# Patient Record
Sex: FEMALE | Race: WHITE | NOT HISPANIC OR LATINO | Employment: FULL TIME | URBAN - METROPOLITAN AREA
[De-identification: names, ages, dates, MRNs, and addresses within clinical notes are randomized per-mention and may not be internally consistent; named-entity substitution may affect disease eponyms.]

---

## 2018-10-07 ENCOUNTER — HOSPITAL ENCOUNTER (EMERGENCY)
Facility: HOSPITAL | Age: 48
Discharge: HOME | End: 2018-10-07
Attending: EMERGENCY MEDICINE
Payer: MEDICAID

## 2018-10-07 VITALS
WEIGHT: 158 LBS | DIASTOLIC BLOOD PRESSURE: 85 MMHG | HEART RATE: 90 BPM | HEIGHT: 68 IN | TEMPERATURE: 98 F | RESPIRATION RATE: 18 BRPM | OXYGEN SATURATION: 100 % | BODY MASS INDEX: 23.95 KG/M2 | SYSTOLIC BLOOD PRESSURE: 141 MMHG

## 2018-10-07 DIAGNOSIS — R51.9 HEADACHE: ICD-10-CM

## 2018-10-07 LAB
ALBUMIN SERPL BCP-MCNC: 4.2 G/DL
ALP SERPL-CCNC: 101 U/L
ALT SERPL W/O P-5'-P-CCNC: 21 U/L
ANION GAP SERPL CALC-SCNC: 10 MMOL/L
AST SERPL-CCNC: 24 U/L
BASOPHILS # BLD AUTO: 0.01 K/UL
BASOPHILS NFR BLD: 0.2 %
BILIRUB SERPL-MCNC: 0.6 MG/DL
BUN SERPL-MCNC: 9 MG/DL
CALCIUM SERPL-MCNC: 9.9 MG/DL
CHLORIDE SERPL-SCNC: 104 MMOL/L
CO2 SERPL-SCNC: 24 MMOL/L
CREAT SERPL-MCNC: 0.7 MG/DL
DIFFERENTIAL METHOD: ABNORMAL
EOSINOPHIL # BLD AUTO: 0 K/UL
EOSINOPHIL NFR BLD: 0.8 %
ERYTHROCYTE [DISTWIDTH] IN BLOOD BY AUTOMATED COUNT: 13.7 %
EST. GFR  (AFRICAN AMERICAN): >60 ML/MIN/1.73 M^2
EST. GFR  (NON AFRICAN AMERICAN): >60 ML/MIN/1.73 M^2
GLUCOSE SERPL-MCNC: 85 MG/DL
HCT VFR BLD AUTO: 43 %
HGB BLD-MCNC: 15.4 G/DL
LYMPHOCYTES # BLD AUTO: 1.1 K/UL
LYMPHOCYTES NFR BLD: 22.8 %
MCH RBC QN AUTO: 30.2 PG
MCHC RBC AUTO-ENTMCNC: 35.8 G/DL
MCV RBC AUTO: 84 FL
MONOCYTES # BLD AUTO: 0.4 K/UL
MONOCYTES NFR BLD: 7.2 %
NEUTROPHILS # BLD AUTO: 3.3 K/UL
NEUTROPHILS NFR BLD: 69 %
PLATELET # BLD AUTO: 258 K/UL
PMV BLD AUTO: 9.1 FL
POTASSIUM SERPL-SCNC: 4.3 MMOL/L
PROT SERPL-MCNC: 8 G/DL
RBC # BLD AUTO: 5.1 M/UL
SODIUM SERPL-SCNC: 138 MMOL/L
WBC # BLD AUTO: 4.83 K/UL

## 2018-10-07 PROCEDURE — 80053 COMPREHEN METABOLIC PANEL: CPT

## 2018-10-07 PROCEDURE — 63600175 PHARM REV CODE 636 W HCPCS: Performed by: EMERGENCY MEDICINE

## 2018-10-07 PROCEDURE — 99284 EMERGENCY DEPT VISIT MOD MDM: CPT | Mod: 25

## 2018-10-07 PROCEDURE — 96375 TX/PRO/DX INJ NEW DRUG ADDON: CPT

## 2018-10-07 PROCEDURE — 96374 THER/PROPH/DIAG INJ IV PUSH: CPT

## 2018-10-07 PROCEDURE — 25000003 PHARM REV CODE 250: Performed by: EMERGENCY MEDICINE

## 2018-10-07 PROCEDURE — 96361 HYDRATE IV INFUSION ADD-ON: CPT

## 2018-10-07 PROCEDURE — 85025 COMPLETE CBC W/AUTO DIFF WBC: CPT

## 2018-10-07 RX ORDER — KETOROLAC TROMETHAMINE 30 MG/ML
30 INJECTION, SOLUTION INTRAMUSCULAR; INTRAVENOUS
Status: COMPLETED | OUTPATIENT
Start: 2018-10-07 | End: 2018-10-07

## 2018-10-07 RX ORDER — DULOXETIN HYDROCHLORIDE 20 MG/1
20 CAPSULE, DELAYED RELEASE ORAL DAILY
COMMUNITY

## 2018-10-07 RX ORDER — BUTALBITAL, ACETAMINOPHEN AND CAFFEINE 50; 325; 40 MG/1; MG/1; MG/1
1 TABLET ORAL EVERY 4 HOURS PRN
Qty: 30 TABLET | Refills: 0 | Status: SHIPPED | OUTPATIENT
Start: 2018-10-07 | End: 2018-11-06

## 2018-10-07 RX ORDER — DEXTROAMPHETAMINE SACCHARATE, AMPHETAMINE ASPARTATE MONOHYDRATE, DEXTROAMPHETAMINE SULFATE AND AMPHETAMINE SULFATE 7.5; 7.5; 7.5; 7.5 MG/1; MG/1; MG/1; MG/1
30 CAPSULE, EXTENDED RELEASE ORAL EVERY MORNING
COMMUNITY

## 2018-10-07 RX ORDER — METOCLOPRAMIDE HYDROCHLORIDE 5 MG/ML
10 INJECTION INTRAMUSCULAR; INTRAVENOUS
Status: COMPLETED | OUTPATIENT
Start: 2018-10-07 | End: 2018-10-07

## 2018-10-07 RX ORDER — ZOLPIDEM TARTRATE 10 MG/1
5 TABLET ORAL NIGHTLY PRN
COMMUNITY
End: 2019-02-18

## 2018-10-07 RX ORDER — DIPHENHYDRAMINE HYDROCHLORIDE 50 MG/ML
25 INJECTION INTRAMUSCULAR; INTRAVENOUS
Status: COMPLETED | OUTPATIENT
Start: 2018-10-07 | End: 2018-10-07

## 2018-10-07 RX ADMIN — SODIUM CHLORIDE 1000 ML: 0.9 INJECTION, SOLUTION INTRAVENOUS at 11:10

## 2018-10-07 RX ADMIN — DIPHENHYDRAMINE HYDROCHLORIDE 25 MG: 50 INJECTION, SOLUTION INTRAMUSCULAR; INTRAVENOUS at 11:10

## 2018-10-07 RX ADMIN — KETOROLAC TROMETHAMINE 30 MG: 30 INJECTION, SOLUTION INTRAMUSCULAR at 11:10

## 2018-10-07 RX ADMIN — METOCLOPRAMIDE 10 MG: 5 INJECTION, SOLUTION INTRAMUSCULAR; INTRAVENOUS at 11:10

## 2018-10-07 NOTE — DISCHARGE INSTRUCTIONS
Patient's headache is consistent with previous headaches and lacks features concerning for emergent or life threatening condition.  I do not suspect SAH, meningitis, increased IC pressure, infectious, toxic, vascular, CNS, or other EMC.  I have discussed this at length with patient.  Regarding treatment, advised patient to take nonsteroidal antiinflammatory medications, acetaminophen, or any medications prescribed as instructed.  To prevent headaches, patient advised to avoid muscle tension (do not stay in one position for long periods of time), avoid eye strain (make sure there is adequate lighting for reading and routine tasks), eat healthy foods, exercise, and do not smoke or drink excessive alcohol.  Patient also advised to avoid overuse of over-the-counter or prescription medications. Patient was instructed to contact primary healthcare provider if: headaches continue to get worse; occur often enough that they affect daily work or normal activities; frequent medication use is needed to manage headaches; headaches that worsen and cause vomiting; or there are any questions or concerns about the condition or care. Advised patient to return to the emergency department or call 911 if they develop a sudden headache that presents suddenly and much worse than usual headaches; have difficulty seeing, speaking, or moving; become confused or have seizure activity; or develop a fever and stiff neck with the headache.

## 2018-10-07 NOTE — ED PROVIDER NOTES
"SCRIBE #1 NOTE: I, Carlitos Aneudy, am scribing for, and in the presence of, Josiah Gore Jr., MD. I have scribed the entire note.      History      Chief Complaint   Patient presents with    Headache     Pt states, "[facility] after hours sent me here because I've been having a bad headache and vomiting, and have a blind spot in my left eye."       Review of patient's allergies indicates:  No Known Allergies     HPI   HPI    10/7/2018, 10:06 AM   History obtained from the patient      History of Present Illness: Laurel FENG is a 48 y.o. female patient who presents to the Emergency Department for an evaluation of a HA which onset gradually yesterday afternoon. Pt has a hx of migraines, but has never experienced a similar HA like this. Pt's states her pain radiates from the top of her head down to her L shoulder. Symptoms are constant and moderate in severity. No mitigating or exacerbating factors reported. Pt notes she has been unable to sleep, so she took ambien around 4 am today, but was unable to keep it down secondary to the vomiting. Associated sxs include dizziness, photophobia, n/v. Patient denies any fever, chills, diarrhea, numbness, tingling, extremity weakness, visual disturbance, neck pain/stiffness and all other sxs at this time. Pt visited [facility] after [facility] clinic, where she was given Zofran. Pt notes she used to take tramadol for migraines, which would help her migraines. No further complaints or concerns at this time.       Arrival mode: Personal vehicle     PCP: Primary Doctor No       Past Medical History:  Past Medical History:   Diagnosis Date    Gestational diabetes     gestational     Migraine headache     Preeclampsia     Urethral stricture        Past Surgical History:  Past Surgical History:   Procedure Laterality Date    APPENDECTOMY       SECTION      x1    TONSILLECTOMY      TUBAL LIGATION      URETHRA SURGERY           Family History:  Family History   Problem " Relation Age of Onset    Hypertension Unknown        Social History:  Social History     Tobacco Use    Smoking status: Never Smoker   Substance and Sexual Activity    Alcohol use: No    Drug use: No    Sexual activity: unknown       ROS   Review of Systems   Constitutional: Negative for chills and fever.   HENT: Negative for sore throat.    Eyes: Positive for photophobia. Negative for visual disturbance.   Respiratory: Negative for shortness of breath.    Cardiovascular: Negative for chest pain.   Gastrointestinal: Positive for nausea and vomiting. Negative for diarrhea.   Genitourinary: Negative for dysuria.   Musculoskeletal: Negative for back pain, neck pain and neck stiffness.   Skin: Negative for rash.   Neurological: Positive for dizziness and headaches. Negative for weakness and numbness.   Hematological: Does not bruise/bleed easily.   Psychiatric/Behavioral: Positive for sleep disturbance.   All other systems reviewed and are negative.      Physical Exam      Initial Vitals [10/07/18 0928]   BP Pulse Resp Temp SpO2   132/87 70 20 97.6 °F (36.4 °C) 100 %      MAP       --          Physical Exam  Nursing Notes and Vital Signs Reviewed.  Constitutional: Patient is in no acute distress. Well-developed and well-nourished.  Head: Atraumatic. Normocephalic.  Eyes: PERRL. EOM intact. Conjunctivae are not pale. No scleral icterus.  ENT: Mucous membranes are moist. Oropharynx is clear and symmetric.    Neck: Supple. Full ROM. No lymphadenopathy. No meningismus.   Cardiovascular: Regular rate. Regular rhythm. No murmurs, rubs, or gallops. Distal pulses are 2+ and symmetric.  Pulmonary/Chest: No respiratory distress. Clear to auscultation bilaterally. No wheezing or rales.  Abdominal: Soft and non-distended.  There is no tenderness.  No rebound, guarding, or rigidity. Good bowel sounds.  Genitourinary: No CVA tenderness  Musculoskeletal: Moves all extremities. No obvious deformities. No edema. No calf  "tenderness.  Skin: Warm and dry.  Neurological:  Alert, awake, and appropriate. CNII-XII intact. GCS 15. Equal  strength. Normal speech.  No acute focal neurological deficits are appreciated.  Psychiatric: Normal affect. Good eye contact. Appropriate in content.    ED Course    Procedures  ED Vital Signs:  Vitals:    10/07/18 0928 10/07/18 0939 10/07/18 0953 10/07/18 1136   BP: 132/87  124/74 (!) 146/85   Pulse: 70 71 64 86   Resp: 20 18 18   Temp: 97.6 °F (36.4 °C)      TempSrc: Oral      SpO2: 100%  100% 100%   Weight: 71.7 kg (158 lb)      Height: 5' 8" (1.727 m)       10/07/18 1202 10/07/18 1238   BP: (!) 157/89 (!) 141/85   Pulse: 72 90   Resp: 18 18   Temp:  97.8 °F (36.6 °C)   TempSrc:     SpO2: 100% 100%   Weight:     Height:         Abnormal Lab Results:  Labs Reviewed   CBC W/ AUTO DIFFERENTIAL - Abnormal; Notable for the following components:       Result Value    MPV 9.1 (*)     All other components within normal limits   COMPREHENSIVE METABOLIC PANEL        All Lab Results:  Results for orders placed or performed during the hospital encounter of 10/07/18   CBC auto differential   Result Value Ref Range    WBC 4.83 3.90 - 12.70 K/uL    RBC 5.10 4.00 - 5.40 M/uL    Hemoglobin 15.4 12.0 - 16.0 g/dL    Hematocrit 43.0 37.0 - 48.5 %    MCV 84 82 - 98 fL    MCH 30.2 27.0 - 31.0 pg    MCHC 35.8 32.0 - 36.0 g/dL    RDW 13.7 11.5 - 14.5 %    Platelets 258 150 - 350 K/uL    MPV 9.1 (L) 9.2 - 12.9 fL    Gran # (ANC) 3.3 1.8 - 7.7 K/uL    Lymph # 1.1 1.0 - 4.8 K/uL    Mono # 0.4 0.3 - 1.0 K/uL    Eos # 0.0 0.0 - 0.5 K/uL    Baso # 0.01 0.00 - 0.20 K/uL    Gran% 69.0 38.0 - 73.0 %    Lymph% 22.8 18.0 - 48.0 %    Mono% 7.2 4.0 - 15.0 %    Eosinophil% 0.8 0.0 - 8.0 %    Basophil% 0.2 0.0 - 1.9 %    Differential Method Automated    Comprehensive metabolic panel   Result Value Ref Range    Sodium 138 136 - 145 mmol/L    Potassium 4.3 3.5 - 5.1 mmol/L    Chloride 104 95 - 110 mmol/L    CO2 24 23 - 29 mmol/L    " Glucose 85 70 - 110 mg/dL    BUN, Bld 9 6 - 20 mg/dL    Creatinine 0.7 0.5 - 1.4 mg/dL    Calcium 9.9 8.7 - 10.5 mg/dL    Total Protein 8.0 6.0 - 8.4 g/dL    Albumin 4.2 3.5 - 5.2 g/dL    Total Bilirubin 0.6 0.1 - 1.0 mg/dL    Alkaline Phosphatase 101 55 - 135 U/L    AST 24 10 - 40 U/L    ALT 21 10 - 44 U/L    Anion Gap 10 8 - 16 mmol/L    eGFR if African American >60 >60 mL/min/1.73 m^2    eGFR if non African American >60 >60 mL/min/1.73 m^2         Imaging Results:  Imaging Results          CT Head Without Contrast (Final result)  Result time 10/07/18 11:00:08    Final result by Armani Torres MD (10/07/18 11:00:08)                 Impression:      No acute abnormality.    All CT scans at this facility use dose modulation, iterative reconstruction, and/or weight based dosing when appropriate to reduce radiation dose to as low as reasonably achievable.      Electronically signed by: Armani Torres  Date:    10/07/2018  Time:    11:00             Narrative:    EXAMINATION:  CT HEAD WITHOUT CONTRAST    CLINICAL HISTORY:  Lung cancer, non-small cell, staging; Headache    TECHNIQUE:  Low dose axial CT images obtained throughout the head without intravenous contrast. Sagittal and coronal reconstructions were performed.    COMPARISON:  None.    FINDINGS:  Intracranial compartment:    Ventricles and sulci are normal in size for age without evidence of hydrocephalus. No extra-axial blood or fluid collections.    The brain parenchyma appears normal. No parenchymal mass, hemorrhage, edema or major vascular distribution infarct.    Skull/extracranial contents (limited evaluation): No fracture. Mastoid air cells and paranasal sinuses are essentially clear.                                        The Emergency Provider reviewed the vital signs and test results, which are outlined above.    ED Discussion     12:30 PM: Reassessed pt at this time. Discussed with pt all pertinent ED information and results. Discussed pt dx and plan of  tx. Gave pt all f/u and return to the ED instructions. All questions and concerns were addressed at this time. Pt expresses understanding of information and instructions, and is comfortable with plan to discharge. Pt is stable for discharge.    Patient's headache is either consistent with previous headache and/or lacks features concerning for emergent or life threatening condition.  I do not suspect SAH, meningitis, increased IC pressure, infectious, toxic, vascular, CNS, or other EMC.  I have discussed this at length with patient and/or family/caretaker.    Patient's headache is consistent with previous headaches and lacks features concerning for emergent or life threatening condition.  I do not suspect SAH, meningitis, increased IC pressure, infectious, toxic, vascular, CNS, or other EMC.  I have discussed this at length with patient.  Regarding treatment, advised patient to take nonsteroidal antiinflammatory medications, acetaminophen, or any medications prescribed as instructed.  To prevent headaches, patient advised to avoid muscle tension (do not stay in one position for long periods of time), avoid eye strain (make sure there is adequate lighting for reading and routine tasks), eat healthy foods, exercise, and do not smoke or drink excessive alcohol.  Patient also advised to avoid overuse of over-the-counter or prescription medications. Patient was instructed to contact primary healthcare provider if: headaches continue to get worse; occur often enough that they affect daily work or normal activities; frequent medication use is needed to manage headaches; headaches that worsen and cause vomiting; or there are any questions or concerns about the condition or care. Advised patient to return to the emergency department or call 911 if they develop a sudden headache that presents suddenly and much worse than usual headaches; have difficulty seeing, speaking, or moving; become confused or have seizure activity; or  develop a fever and stiff neck with the headache.         ED Medication(s):  Medications   sodium chloride 0.9% bolus 1,000 mL (0 mLs Intravenous Stopped 10/7/18 1238)   metoclopramide HCl injection 10 mg (10 mg Intravenous Given 10/7/18 1129)   diphenhydrAMINE injection 25 mg (25 mg Intravenous Given 10/7/18 1130)   ketorolac injection 30 mg (30 mg Intravenous Given 10/7/18 1129)     Discharge Medication List as of 10/7/2018 12:24 PM      START taking these medications    Details   butalbital-acetaminophen-caffeine -40 mg (FIORICET, ESGIC) -40 mg per tablet Take 1 tablet by mouth every 4 (four) hours as needed for Pain or Headaches., Starting Sun 10/7/2018, Until Tue 11/6/2018, Print          Follow-up Information     [facility]. Schedule an appointment as soon as possible for a visit in 1 week.    Specialty:  Internal Medicine  Contact information:  [address] [facility] [address] [phone]  Additional information:  (off [facility]) 1st floor           [facility]. Schedule an appointment as soon as possible for a visit in 1 week.    Contact information:  [address] [facility] [address] [phone]             [facility]    Specialty:  Emergency Medicine  Why:  As needed, If symptoms worsen  Contact information:  [address] [facility] [address] [phone]                   Medical Decision Making    Medical Decision Making:   Clinical Tests:   Lab Tests: Ordered and Reviewed  Radiological Study: Ordered and Reviewed           Scribe Attestation:   Scribe #1: I performed the above scribed service and the documentation accurately describes the services I performed. I attest to the accuracy of the note.    Attending:   Physician Attestation Statement for Scribe #1: I, Josiah Gore Jr., MD, personally performed the services described in this documentation, as scribed by Carlitos Amado, in my  presence, and it is both accurate and complete.          Clinical Impression       ICD-10-CM ICD-9-CM   1. Headache R51 784.0       Disposition:   Disposition: Discharged  Condition: Stable         Josiah Gore Jr., MD  10/07/18 3030

## 2019-02-18 ENCOUNTER — HOSPITAL ENCOUNTER (EMERGENCY)
Facility: HOSPITAL | Age: 49
Discharge: HOME | End: 2019-02-18
Attending: EMERGENCY MEDICINE
Payer: MEDICAID

## 2019-02-18 VITALS
OXYGEN SATURATION: 100 % | SYSTOLIC BLOOD PRESSURE: 118 MMHG | HEART RATE: 97 BPM | BODY MASS INDEX: 27.76 KG/M2 | DIASTOLIC BLOOD PRESSURE: 72 MMHG | RESPIRATION RATE: 20 BRPM | HEIGHT: 68 IN | TEMPERATURE: 98 F | WEIGHT: 183.19 LBS

## 2019-02-18 DIAGNOSIS — R11.2 NAUSEA VOMITING AND DIARRHEA: Primary | ICD-10-CM

## 2019-02-18 DIAGNOSIS — R19.7 NAUSEA VOMITING AND DIARRHEA: Primary | ICD-10-CM

## 2019-02-18 LAB
ALBUMIN SERPL BCP-MCNC: 3.9 G/DL
ALP SERPL-CCNC: 99 U/L
ALT SERPL W/O P-5'-P-CCNC: 23 U/L
AMPHET+METHAMPHET UR QL: NORMAL
ANION GAP SERPL CALC-SCNC: 9 MMOL/L
AST SERPL-CCNC: 21 U/L
B-HCG UR QL: NEGATIVE
BACTERIA #/AREA URNS HPF: ABNORMAL /HPF
BARBITURATES UR QL SCN>200 NG/ML: NORMAL
BASOPHILS # BLD AUTO: 0.02 K/UL
BASOPHILS NFR BLD: 0.3 %
BENZODIAZ UR QL SCN>200 NG/ML: NEGATIVE
BILIRUB SERPL-MCNC: 0.4 MG/DL
BILIRUB UR QL STRIP: NEGATIVE
BUN SERPL-MCNC: 24 MG/DL
BZE UR QL SCN: NEGATIVE
CALCIUM SERPL-MCNC: 9.8 MG/DL
CANNABINOIDS UR QL SCN: NEGATIVE
CHLORIDE SERPL-SCNC: 106 MMOL/L
CLARITY UR: CLEAR
CO2 SERPL-SCNC: 25 MMOL/L
COLOR UR: YELLOW
CREAT SERPL-MCNC: 0.9 MG/DL
CREAT UR-MCNC: 132.9 MG/DL
DIFFERENTIAL METHOD: NORMAL
EOSINOPHIL # BLD AUTO: 0.2 K/UL
EOSINOPHIL NFR BLD: 2.4 %
ERYTHROCYTE [DISTWIDTH] IN BLOOD BY AUTOMATED COUNT: 13.6 %
EST. GFR  (AFRICAN AMERICAN): >60 ML/MIN/1.73 M^2
EST. GFR  (NON AFRICAN AMERICAN): >60 ML/MIN/1.73 M^2
GLUCOSE SERPL-MCNC: 95 MG/DL
GLUCOSE UR QL STRIP: NEGATIVE
HCT VFR BLD AUTO: 45.3 %
HGB BLD-MCNC: 15.6 G/DL
HGB UR QL STRIP: ABNORMAL
KETONES UR QL STRIP: NEGATIVE
LEUKOCYTE ESTERASE UR QL STRIP: NEGATIVE
LIPASE SERPL-CCNC: 31 U/L
LYMPHOCYTES # BLD AUTO: 2 K/UL
LYMPHOCYTES NFR BLD: 32.1 %
MCH RBC QN AUTO: 30.1 PG
MCHC RBC AUTO-ENTMCNC: 34.4 G/DL
MCV RBC AUTO: 88 FL
METHADONE UR QL SCN>300 NG/ML: NEGATIVE
MICROSCOPIC COMMENT: ABNORMAL
MONOCYTES # BLD AUTO: 0.6 K/UL
MONOCYTES NFR BLD: 9.8 %
NEUTROPHILS # BLD AUTO: 3.5 K/UL
NEUTROPHILS NFR BLD: 55.4 %
NITRITE UR QL STRIP: NEGATIVE
OPIATES UR QL SCN: NEGATIVE
PCP UR QL SCN>25 NG/ML: NEGATIVE
PH UR STRIP: 6 [PH] (ref 5–8)
PLATELET # BLD AUTO: 254 K/UL
PMV BLD AUTO: 9.4 FL
POTASSIUM SERPL-SCNC: 3.3 MMOL/L
PROT SERPL-MCNC: 7.3 G/DL
PROT UR QL STRIP: ABNORMAL
RBC # BLD AUTO: 5.18 M/UL
RBC #/AREA URNS HPF: 1 /HPF (ref 0–4)
SODIUM SERPL-SCNC: 140 MMOL/L
SP GR UR STRIP: >=1.03 (ref 1–1.03)
SQUAMOUS #/AREA URNS HPF: 5 /HPF
TOXICOLOGY INFORMATION: NORMAL
URN SPEC COLLECT METH UR: ABNORMAL
UROBILINOGEN UR STRIP-ACNC: NEGATIVE EU/DL
WBC # BLD AUTO: 6.33 K/UL
WBC #/AREA URNS HPF: 3 /HPF (ref 0–5)
WBC CASTS #/AREA URNS LPF: 1 /LPF
YEAST URNS QL MICRO: ABNORMAL

## 2019-02-18 PROCEDURE — 99284 EMERGENCY DEPT VISIT MOD MDM: CPT | Mod: 25

## 2019-02-18 PROCEDURE — 80307 DRUG TEST PRSMV CHEM ANLYZR: CPT

## 2019-02-18 PROCEDURE — 63600175 PHARM REV CODE 636 W HCPCS: Performed by: EMERGENCY MEDICINE

## 2019-02-18 PROCEDURE — 80053 COMPREHEN METABOLIC PANEL: CPT

## 2019-02-18 PROCEDURE — 96372 THER/PROPH/DIAG INJ SC/IM: CPT

## 2019-02-18 PROCEDURE — 81025 URINE PREGNANCY TEST: CPT

## 2019-02-18 PROCEDURE — 36415 COLL VENOUS BLD VENIPUNCTURE: CPT

## 2019-02-18 PROCEDURE — 81000 URINALYSIS NONAUTO W/SCOPE: CPT | Mod: 59

## 2019-02-18 PROCEDURE — 85025 COMPLETE CBC W/AUTO DIFF WBC: CPT

## 2019-02-18 PROCEDURE — 83690 ASSAY OF LIPASE: CPT

## 2019-02-18 RX ORDER — DICYCLOMINE HYDROCHLORIDE 10 MG/ML
20 INJECTION INTRAMUSCULAR
Status: COMPLETED | OUTPATIENT
Start: 2019-02-18 | End: 2019-02-18

## 2019-02-18 RX ORDER — GABAPENTIN 300 MG/1
300 CAPSULE ORAL 3 TIMES DAILY
COMMUNITY

## 2019-02-18 RX ORDER — ONDANSETRON 4 MG/1
4 TABLET, FILM COATED ORAL EVERY 6 HOURS
Qty: 12 TABLET | Refills: 0 | Status: SHIPPED | OUTPATIENT
Start: 2019-02-18

## 2019-02-18 RX ORDER — CYCLOBENZAPRINE HCL 10 MG
10 TABLET ORAL 3 TIMES DAILY PRN
COMMUNITY

## 2019-02-18 RX ORDER — QUETIAPINE FUMARATE 25 MG/1
TABLET, FILM COATED ORAL
COMMUNITY

## 2019-02-18 RX ADMIN — DICYCLOMINE HYDROCHLORIDE 20 MG: 20 INJECTION, SOLUTION INTRAMUSCULAR at 10:02

## 2019-02-19 NOTE — ED PROVIDER NOTES
SCRIBE #1 NOTE: I, Tran Jin, am scribing for, and in the presence of, Kesha Sierra MD. I have scribed the entire note.       History     Chief Complaint   Patient presents with    Emesis     vomiting, diarrhea with abd pain for 1 day.  No fever. Denies dysuria, discharge     Review of patient's allergies indicates:  No Known Allergies      History of Present Illness     HPI    2019, 10:09 PM  History obtained from the patient      History of Present Illness: Laurel FENG is a 48 y.o. female patient with a SHx of  section and tubal ligation who presents to the Emergency Department for evaluation of epigastric abdominal pain which onset gradually yesterday. Symptoms are constant and moderate in severity. No mitigating or exacerbating factors reported. Associated sxs include vomiting which resolved yesterday and diarrhea. Patient denies any fever, chills, HA, CP, SOB, constipation, weakness, dizziness, and all other sxs at this time. No prior Tx. No further complaints or concerns at this time.         Arrival mode: Personal vehicle      PCP: Vince Anderson MD        Past Medical History:  Past Medical History:   Diagnosis Date    Gestational diabetes     gestational     Migraine headache     Preeclampsia     Urethral stricture        Past Surgical History:  Past Surgical History:   Procedure Laterality Date    APPENDECTOMY       SECTION      x1    TONSILLECTOMY      TUBAL LIGATION      URETHRA SURGERY           Family History:  Family History   Problem Relation Age of Onset    Hypertension Unknown        Social History:  Social History     Tobacco Use    Smoking status: Never Smoker   Substance and Sexual Activity    Alcohol use: No    Drug use: No    Sexual activity: Not on file        Review of Systems     Review of Systems   Constitutional: Negative for chills and fever.   HENT: Negative for congestion and sore throat.    Eyes: Negative for pain.  "  Respiratory: Negative for shortness of breath.    Cardiovascular: Negative for chest pain.   Gastrointestinal: Positive for abdominal pain (epigastric ), diarrhea and vomiting (resolved). Negative for anal bleeding, blood in stool, constipation and nausea.   Genitourinary: Negative for dysuria and hematuria.   Musculoskeletal: Negative for back pain.   Skin: Negative for rash.   Neurological: Negative for dizziness, weakness and headaches.   Psychiatric/Behavioral: Negative for confusion.   All other systems reviewed and are negative.       Physical Exam     Initial Vitals [02/18/19 2121]   BP Pulse Resp Temp SpO2   (!) 146/75 90 20 97.9 °F (36.6 °C) 97 %      MAP       --          Physical Exam  Nursing Notes and Vital Signs Reviewed.  Constitutional: Patient is in no apparent distress. Well-developed and well-nourished.  Head: Atraumatic. Normocephalic.  Eyes: PERRL. EOM intact. Conjunctivae are not pale. No scleral icterus.  ENT: Mucous membranes are moist. Oropharynx is clear and symmetric.    Neck: Supple. Full ROM. No lymphadenopathy.  Cardiovascular: Regular rate. Regular rhythm. No murmurs, rubs, or gallops. Distal pulses are 2+ and symmetric.  Pulmonary/Chest: No respiratory distress. Clear to auscultation bilaterally. No wheezing or rales.  Abdominal: Soft and non-distended.  There is no tenderness.  No rebound, guarding, or rigidity. Good bowel sounds.  Genitourinary: No CVA tenderness  Musculoskeletal: Moves all extremities. No obvious deformities. No edema. No calf tenderness.  Skin: Warm and dry.  Neurological:  Alert, awake, and appropriate.  Normal speech.  No acute focal neurological deficits are appreciated.  Psychiatric: Normal affect. Good eye contact. Appropriate in content.     ED Course   Procedures  ED Vital Signs:  Vitals:    02/18/19 2121   BP: (!) 146/75   Pulse: 90   Resp: 20   Temp: 97.9 °F (36.6 °C)   TempSrc: Oral   SpO2: 97%   Weight: 83.1 kg (183 lb 3.2 oz)   Height: 5' 8" (1.727 " m)       Abnormal Lab Results:  Labs Reviewed   COMPREHENSIVE METABOLIC PANEL - Abnormal; Notable for the following components:       Result Value    Potassium 3.3 (*)     BUN, Bld 24 (*)     All other components within normal limits   URINALYSIS, REFLEX TO URINE CULTURE - Abnormal; Notable for the following components:    Specific Gravity, UA >=1.030 (*)     Protein, UA Trace (*)     Occult Blood UA 2+ (*)     All other components within normal limits    Narrative:     Preferred Collection Type->Urine, Clean Catch   URINALYSIS MICROSCOPIC - Abnormal; Notable for the following components:    Yeast, UA Rare (*)     WBC Casts, UA 1 (*)     All other components within normal limits    Narrative:     Preferred Collection Type->Urine, Clean Catch   CBC W/ AUTO DIFFERENTIAL   LIPASE   DRUG SCREEN PANEL, URINE EMERGENCY   PREGNANCY TEST, URINE RAPID        All Lab Results:  Results for orders placed or performed during the hospital encounter of 02/18/19   CBC auto differential   Result Value Ref Range    WBC 6.33 3.90 - 12.70 K/uL    RBC 5.18 4.00 - 5.40 M/uL    Hemoglobin 15.6 12.0 - 16.0 g/dL    Hematocrit 45.3 37.0 - 48.5 %    MCV 88 82 - 98 fL    MCH 30.1 27.0 - 31.0 pg    MCHC 34.4 32.0 - 36.0 g/dL    RDW 13.6 11.5 - 14.5 %    Platelets 254 150 - 350 K/uL    MPV 9.4 9.2 - 12.9 fL    Gran # (ANC) 3.5 1.8 - 7.7 K/uL    Lymph # 2.0 1.0 - 4.8 K/uL    Mono # 0.6 0.3 - 1.0 K/uL    Eos # 0.2 0.0 - 0.5 K/uL    Baso # 0.02 0.00 - 0.20 K/uL    Gran% 55.4 38.0 - 73.0 %    Lymph% 32.1 18.0 - 48.0 %    Mono% 9.8 4.0 - 15.0 %    Eosinophil% 2.4 0.0 - 8.0 %    Basophil% 0.3 0.0 - 1.9 %    Differential Method Automated    Comprehensive metabolic panel   Result Value Ref Range    Sodium 140 136 - 145 mmol/L    Potassium 3.3 (L) 3.5 - 5.1 mmol/L    Chloride 106 95 - 110 mmol/L    CO2 25 23 - 29 mmol/L    Glucose 95 70 - 110 mg/dL    BUN, Bld 24 (H) 6 - 20 mg/dL    Creatinine 0.9 0.5 - 1.4 mg/dL    Calcium 9.8 8.7 - 10.5 mg/dL    Total  Protein 7.3 6.0 - 8.4 g/dL    Albumin 3.9 3.5 - 5.2 g/dL    Total Bilirubin 0.4 0.1 - 1.0 mg/dL    Alkaline Phosphatase 99 55 - 135 U/L    AST 21 10 - 40 U/L    ALT 23 10 - 44 U/L    Anion Gap 9 8 - 16 mmol/L    eGFR if African American >60 >60 mL/min/1.73 m^2    eGFR if non African American >60 >60 mL/min/1.73 m^2   Lipase   Result Value Ref Range    Lipase 31 4 - 60 U/L   Urinalysis, Reflex to Urine Culture Urine, Clean Catch   Result Value Ref Range    Specimen UA Urine, Clean Catch     Color, UA Yellow Yellow, Straw, Zaida    Appearance, UA Clear Clear    pH, UA 6.0 5.0 - 8.0    Specific Gravity, UA >=1.030 (A) 1.005 - 1.030    Protein, UA Trace (A) Negative    Glucose, UA Negative Negative    Ketones, UA Negative Negative    Bilirubin (UA) Negative Negative    Occult Blood UA 2+ (A) Negative    Nitrite, UA Negative Negative    Urobilinogen, UA Negative <2.0 EU/dL    Leukocytes, UA Negative Negative   Drug screen panel, emergency   Result Value Ref Range    Benzodiazepines Negative     Methadone metabolites Negative     Cocaine (Metab.) Negative     Opiate Scrn, Ur Negative     Barbiturate Screen, Ur Presumptive Positive     Amphetamine Screen, Ur Presumptive Positive     THC Negative     Phencyclidine Negative     Creatinine, Random Ur 132.9 15.0 - 325.0 mg/dL    Toxicology Information SEE COMMENT    Pregnancy, urine rapid (UPT)   Result Value Ref Range    Preg Test, Ur Negative    Urinalysis Microscopic   Result Value Ref Range    RBC, UA 1 0 - 4 /hpf    WBC, UA 3 0 - 5 /hpf    Bacteria, UA Rare None-Occ /hpf    Yeast, UA Rare (A) None    Squam Epithel, UA 5 /hpf    WBC Casts, UA 1 (A) None /lpf    Microscopic Comment SEE COMMENT          Imaging Results:  Imaging Results    None                 The Emergency Provider reviewed the vital signs and test results, which are outlined above.     ED Discussion     11:01 PM: Reassessed pt at this time.  Pt states her condition has improved at this time, she is  tolerating po. Discussed with pt all pertinent ED information and results. Discussed pt dx and plan of tx. Gave pt all f/u and return to the ED instructions. All questions and concerns were addressed at this time. Pt expresses understanding of information and instructions, and is comfortable with plan to discharge. Pt is stable for discharge.        ED Medication(s):  Medications   dicyclomine injection 20 mg (20 mg Intramuscular Given 2/18/19 2219)       New Prescriptions    ONDANSETRON (ZOFRAN) 4 MG TABLET    Take 1 tablet (4 mg total) by mouth every 6 (six) hours.       Follow-up Information     Vince Anderson MD. Schedule an appointment as soon as possible for a visit in 2 days.    Specialty:  Family Medicine  Why:  Return to the Emergency Room, If symptoms worsen  Contact information:  [address] [address]  [phone]                         Medical Decision Making:   Clinical Tests:   Lab Tests: Ordered and Reviewed             Scribe Attestation:   Scribe #1: I performed the above scribed service and the documentation accurately describes the services I performed. I attest to the accuracy of the note.     Attending:   Physician Attestation Statement for Scribe #1: I, Kesha Sierra MD, personally performed the services described in this documentation, as scribed by Tran Jin, in my presence, and it is both accurate and complete.           Clinical Impression       ICD-10-CM ICD-9-CM   1. Nausea vomiting and diarrhea R11.2 787.91    R19.7 787.01       Disposition:   Disposition: Discharged  Condition: Stable         Kesha Sierra MD  02/18/19 3925

## 2019-02-19 NOTE — ED NOTES
Pt. Reports abdominal pain has improved and denies and N/V at this time.  Provided a glass of water to pt per MD request to PO challenge pt. Will continue to monitor.

## 2020-06-24 ENCOUNTER — HOSPITAL ENCOUNTER (EMERGENCY)
Facility: HOSPITAL | Age: 50
Discharge: HOME | End: 2020-06-24
Attending: EMERGENCY MEDICINE
Payer: MEDICAID

## 2020-06-24 VITALS
DIASTOLIC BLOOD PRESSURE: 89 MMHG | TEMPERATURE: 97 F | HEART RATE: 66 BPM | BODY MASS INDEX: 31.91 KG/M2 | OXYGEN SATURATION: 96 % | WEIGHT: 210.56 LBS | RESPIRATION RATE: 16 BRPM | SYSTOLIC BLOOD PRESSURE: 151 MMHG | HEIGHT: 68 IN

## 2020-06-24 DIAGNOSIS — M25.512 ACUTE PAIN OF LEFT SHOULDER: Primary | ICD-10-CM

## 2020-06-24 DIAGNOSIS — S63.502A SPRAIN OF LEFT WRIST, INITIAL ENCOUNTER: ICD-10-CM

## 2020-06-24 DIAGNOSIS — M25.532 LEFT WRIST PAIN: ICD-10-CM
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
## 2020-06-24 LAB — HIV 1+2 AB+HIV1 P24 AG SERPL QL IA: NEGATIVE

## 2020-06-24 PROCEDURE — 86703 HIV-1/HIV-2 1 RESULT ANTBDY: CPT

## 2020-06-24 PROCEDURE — 99284 EMERGENCY DEPT VISIT MOD MDM: CPT | Mod: 25

## 2020-06-24 PROCEDURE — 29125 APPL SHORT ARM SPLINT STATIC: CPT | Mod: LT

## 2020-06-24 RX ORDER — IBUPROFEN 800 MG/1
800 TABLET ORAL EVERY 6 HOURS PRN
Qty: 20 TABLET | Refills: 0 | Status: SHIPPED | OUTPATIENT
Start: 2020-06-24

## 2020-06-24 NOTE — Clinical Note
Laurel FENG was seen and treated in our emergency department on 6/24/2020.  She may return to work on 06/27/2020.       If you have any questions or concerns, please don't hesitate to call.      Raymond Junior Jr., FNP

## 2020-06-24 NOTE — ED PROVIDER NOTES
Encounter Date: 2020       History     Chief Complaint   Patient presents with    Arm Pain     pt states she tripped on the boat this week and reports left wrist, elbow, and shoulder pain worsening since then     The history is provided by the patient.   L arm pain after falling on boat this past weekend. Pt denies any other injuries due to fall    Review of patient's allergies indicates:  No Known Allergies  Past Medical History:   Diagnosis Date    Gestational diabetes     gestational     Migraine headache     Preeclampsia     Urethral stricture      Past Surgical History:   Procedure Laterality Date    APPENDECTOMY       SECTION      x1    TONSILLECTOMY      TUBAL LIGATION      URETHRA SURGERY       Family History   Problem Relation Age of Onset    Hypertension Unknown      Social History     Tobacco Use    Smoking status: Never Smoker   Substance Use Topics    Alcohol use: No    Drug use: No     Review of Systems   Constitutional: Negative for fever.   HENT: Negative for sore throat.    Respiratory: Negative for shortness of breath.    Cardiovascular: Negative for chest pain.   Gastrointestinal: Negative for nausea.   Genitourinary: Negative for dysuria.   Musculoskeletal: Negative for back pain.        + L arm pain   Skin: Negative for rash.   Neurological: Negative for weakness.   Hematological: Does not bruise/bleed easily.   All other systems reviewed and are negative.      Physical Exam     Initial Vitals [20 1842]   BP Pulse Resp Temp SpO2   (!) 151/89 66 16 96.8 °F (36 °C) 96 %      MAP       --         Physical Exam    Constitutional: She appears well-developed and well-nourished. She is not diaphoretic. No distress.   HENT:   Head: Normocephalic and atraumatic.   Eyes: Conjunctivae and EOM are normal. Pupils are equal, round, and reactive to light.   Neck: Normal range of motion. Neck supple.   Cardiovascular: Normal rate, regular rhythm and normal heart sounds.   No  murmur heard.  Pulmonary/Chest: Breath sounds normal. No respiratory distress. She has no wheezes. She has no rales.   Abdominal: Soft. Bowel sounds are normal. There is no abdominal tenderness. There is no rebound and no guarding.   Musculoskeletal: Normal range of motion. No tenderness or edema.      Comments: LUE: has no evident deformity. negative for swelling. Positive for tenderness to anterior shoulder and radial dorsal wrist. ROM is normal. Cap refill distally is <2 seconds. Radial pulses are equal and 2+ bilaterally. No motor deficit. No distal sensory deficit.   Neurological: She is alert and oriented to person, place, and time. No cranial nerve deficit. GCS score is 15. GCS eye subscore is 4. GCS verbal subscore is 5. GCS motor subscore is 6.   Skin: Skin is warm and dry. Capillary refill takes less than 2 seconds.   Psychiatric: She has a normal mood and affect. Thought content normal.         ED Course   Orthopedic Injury    Date/Time: 6/24/2020 7:51 PM  Performed by: DESMOND Delgado Jr.  Authorized by: Carlos Ann Jr., MD     Consent Done?:  Yes  Universal Protocol:     Verbal consent obtained?: Yes      Consent given by:  Patient  Injury:     Injury location:  Wrist    Location details:  Left wrist    Injury type:  Soft tissue      Pre-procedure assessment:     Neurovascular status: Neurovascularly intact      Distal perfusion: normal      Neurological function: normal      Range of motion: normal        Selections made in this section will also lock the Injury type section above.:     Immobilization:  Sling    Splint type:  Volar short arm    Complications: No    Post-procedure assessment:     Neurovascular status: Neurovascularly intact      Distal perfusion: normal      Neurological function: normal      Range of motion: splinted      Patient tolerance:  Patient tolerated the procedure well with no immediate complications      Labs Reviewed   HIV 1 / 2 ANTIBODY          Imaging  Results    None                  I discussed with patient and/or family/caretaker that negative X-ray does not rule out occult fracture or other soft tissue injury.  Persistent pain greater than 7-10 days or increased pain requires follow up, specifically with orthopedics.                            Clinical Impression:       ICD-10-CM ICD-9-CM   1. Acute pain of left shoulder  M25.512 719.41   2. Fall  W19.XXXA E888.9   3. Left wrist pain  M25.532 719.43   4. Sprain of left wrist, initial encounter  S63.502A 842.00                                Raymond Junior Jr., [facility]  06/24/20 1952

## 2020-06-25 ENCOUNTER — PES CALL (OUTPATIENT)
Dept: ADMINISTRATIVE | Facility: CLINIC | Age: 50
End: 2020-06-25

## 2020-06-25 NOTE — ED TRIAGE NOTES
Pt. Fell the other day on boat. Patient complains of left wrist, left elbow and left shoulder pain, guarding.

## 2021-03-08 ENCOUNTER — HOSPITAL ENCOUNTER (EMERGENCY)
Facility: HOSPITAL | Age: 51
Discharge: HOME | End: 2021-03-08
Attending: EMERGENCY MEDICINE
Payer: MEDICAID

## 2021-03-08 VITALS
RESPIRATION RATE: 18 BRPM | HEIGHT: 68 IN | TEMPERATURE: 99 F | DIASTOLIC BLOOD PRESSURE: 98 MMHG | OXYGEN SATURATION: 96 % | WEIGHT: 207.69 LBS | SYSTOLIC BLOOD PRESSURE: 164 MMHG | BODY MASS INDEX: 31.48 KG/M2 | HEART RATE: 105 BPM

## 2021-03-08 DIAGNOSIS — R07.9 CHEST PAIN: ICD-10-CM

## 2021-03-08 DIAGNOSIS — R07.9 CHEST PAIN, UNSPECIFIED TYPE: Primary | ICD-10-CM

## 2021-03-08 LAB
ALBUMIN SERPL BCP-MCNC: 3.9 G/DL (ref 3.5–5.2)
ALP SERPL-CCNC: 99 U/L (ref 55–135)
ALT SERPL W/O P-5'-P-CCNC: 19 U/L (ref 10–44)
ANION GAP SERPL CALC-SCNC: 13 MMOL/L (ref 8–16)
AST SERPL-CCNC: 22 U/L (ref 10–40)
BASOPHILS # BLD AUTO: 0.02 K/UL (ref 0–0.2)
BASOPHILS NFR BLD: 0.6 % (ref 0–1.9)
BILIRUB SERPL-MCNC: 0.5 MG/DL (ref 0.1–1)
BUN SERPL-MCNC: 10 MG/DL (ref 6–20)
CALCIUM SERPL-MCNC: 8.9 MG/DL (ref 8.7–10.5)
CHLORIDE SERPL-SCNC: 105 MMOL/L (ref 95–110)
CK SERPL-CCNC: 72 U/L (ref 20–180)
CO2 SERPL-SCNC: 21 MMOL/L (ref 23–29)
CREAT SERPL-MCNC: 0.6 MG/DL (ref 0.5–1.4)
DIFFERENTIAL METHOD: ABNORMAL
EOSINOPHIL # BLD AUTO: 0.1 K/UL (ref 0–0.5)
EOSINOPHIL NFR BLD: 2.5 % (ref 0–8)
ERYTHROCYTE [DISTWIDTH] IN BLOOD BY AUTOMATED COUNT: 12.9 % (ref 11.5–14.5)
EST. GFR  (AFRICAN AMERICAN): >60 ML/MIN/1.73 M^2
EST. GFR  (NON AFRICAN AMERICAN): >60 ML/MIN/1.73 M^2
GLUCOSE SERPL-MCNC: 88 MG/DL (ref 70–110)
HCT VFR BLD AUTO: 33.9 % (ref 37–48.5)
HCV AB SERPL QL IA: NEGATIVE
HGB BLD-MCNC: 11.4 G/DL (ref 12–16)
HIV 1+2 AB+HIV1 P24 AG SERPL QL IA: NEGATIVE
IMM GRANULOCYTES # BLD AUTO: 0.02 K/UL (ref 0–0.04)
IMM GRANULOCYTES NFR BLD AUTO: 0.6 % (ref 0–0.5)
LYMPHOCYTES # BLD AUTO: 1.2 K/UL (ref 1–4.8)
LYMPHOCYTES NFR BLD: 34.4 % (ref 18–48)
MCH RBC QN AUTO: 29.5 PG (ref 27–31)
MCHC RBC AUTO-ENTMCNC: 33.6 G/DL (ref 32–36)
MCV RBC AUTO: 88 FL (ref 82–98)
MONOCYTES # BLD AUTO: 0.3 K/UL (ref 0.3–1)
MONOCYTES NFR BLD: 7.3 % (ref 4–15)
NEUTROPHILS # BLD AUTO: 1.9 K/UL (ref 1.8–7.7)
NEUTROPHILS NFR BLD: 54.6 % (ref 38–73)
NRBC BLD-RTO: 0 /100 WBC
PLATELET # BLD AUTO: 75 K/UL (ref 150–350)
PLATELET BLD QL SMEAR: ABNORMAL
PMV BLD AUTO: 10.4 FL (ref 9.2–12.9)
POTASSIUM SERPL-SCNC: 3.9 MMOL/L (ref 3.5–5.1)
PROT SERPL-MCNC: 6.9 G/DL (ref 6–8.4)
RBC # BLD AUTO: 3.86 M/UL (ref 4–5.4)
SODIUM SERPL-SCNC: 139 MMOL/L (ref 136–145)
TROPONIN I SERPL DL<=0.01 NG/ML-MCNC: 0.01 NG/ML (ref 0–0.03)
WBC # BLD AUTO: 3.55 K/UL (ref 3.9–12.7)

## 2021-03-08 PROCEDURE — 80053 COMPREHEN METABOLIC PANEL: CPT | Performed by: NURSE PRACTITIONER

## 2021-03-08 PROCEDURE — 93005 ELECTROCARDIOGRAM TRACING: CPT

## 2021-03-08 PROCEDURE — 84484 ASSAY OF TROPONIN QUANT: CPT | Performed by: NURSE PRACTITIONER

## 2021-03-08 PROCEDURE — 86803 HEPATITIS C AB TEST: CPT | Performed by: EMERGENCY MEDICINE

## 2021-03-08 PROCEDURE — 93010 ELECTROCARDIOGRAM REPORT: CPT | Mod: ,,, | Performed by: INTERNAL MEDICINE

## 2021-03-08 PROCEDURE — 25000003 PHARM REV CODE 250: Performed by: NURSE PRACTITIONER

## 2021-03-08 PROCEDURE — 85025 COMPLETE CBC W/AUTO DIFF WBC: CPT | Performed by: NURSE PRACTITIONER

## 2021-03-08 PROCEDURE — 86703 HIV-1/HIV-2 1 RESULT ANTBDY: CPT | Performed by: EMERGENCY MEDICINE

## 2021-03-08 PROCEDURE — 82550 ASSAY OF CK (CPK): CPT | Performed by: NURSE PRACTITIONER

## 2021-03-08 PROCEDURE — 99285 EMERGENCY DEPT VISIT HI MDM: CPT | Mod: 25

## 2021-03-08 PROCEDURE — 36000 PLACE NEEDLE IN VEIN: CPT

## 2021-03-08 PROCEDURE — 93010 EKG 12-LEAD: ICD-10-PCS | Mod: ,,, | Performed by: INTERNAL MEDICINE

## 2021-03-08 RX ORDER — ASPIRIN 325 MG
325 TABLET, DELAYED RELEASE (ENTERIC COATED) ORAL
Status: COMPLETED | OUTPATIENT
Start: 2021-03-08 | End: 2021-03-08

## 2021-03-08 RX ADMIN — ASPIRIN 325 MG: 325 TABLET, COATED ORAL at 03:03

## 2021-07-04 ENCOUNTER — HOSPITAL ENCOUNTER (EMERGENCY)
Facility: HOSPITAL | Age: 51
Discharge: HOME | End: 2021-07-04
Attending: FAMILY MEDICINE
Payer: MEDICAID

## 2021-07-04 VITALS
WEIGHT: 209.19 LBS | DIASTOLIC BLOOD PRESSURE: 82 MMHG | OXYGEN SATURATION: 99 % | HEART RATE: 80 BPM | RESPIRATION RATE: 16 BRPM | SYSTOLIC BLOOD PRESSURE: 138 MMHG | TEMPERATURE: 98 F | HEIGHT: 68 IN | BODY MASS INDEX: 31.71 KG/M2

## 2021-07-04 DIAGNOSIS — M25.511 RIGHT SHOULDER PAIN: ICD-10-CM

## 2021-07-04 DIAGNOSIS — M25.511 ACUTE PAIN OF RIGHT SHOULDER: Primary | ICD-10-CM

## 2021-07-04 PROCEDURE — 63600175 PHARM REV CODE 636 W HCPCS: Performed by: FAMILY MEDICINE

## 2021-07-04 PROCEDURE — 99284 EMERGENCY DEPT VISIT MOD MDM: CPT | Mod: 25

## 2021-07-04 PROCEDURE — 96372 THER/PROPH/DIAG INJ SC/IM: CPT

## 2021-07-04 RX ORDER — KETOROLAC TROMETHAMINE 30 MG/ML
60 INJECTION, SOLUTION INTRAMUSCULAR; INTRAVENOUS
Status: COMPLETED | OUTPATIENT
Start: 2021-07-04 | End: 2021-07-04

## 2021-07-04 RX ORDER — MELOXICAM 15 MG/1
15 TABLET ORAL DAILY
Qty: 20 TABLET | Refills: 0 | Status: SHIPPED | OUTPATIENT
Start: 2021-07-04

## 2021-07-04 RX ORDER — CYCLOBENZAPRINE HCL 10 MG
10 TABLET ORAL 3 TIMES DAILY PRN
Qty: 15 TABLET | Refills: 0 | Status: SHIPPED | OUTPATIENT
Start: 2021-07-04 | End: 2021-07-09

## 2021-07-04 RX ADMIN — KETOROLAC TROMETHAMINE 60 MG: 30 INJECTION, SOLUTION INTRAMUSCULAR at 11:07

## 2022-08-13 ENCOUNTER — HOSPITAL ENCOUNTER (EMERGENCY)
Facility: HOSPITAL | Age: 52
Discharge: HOME | End: 2022-08-13
Attending: FAMILY MEDICINE
Payer: MEDICAID

## 2022-08-13 VITALS
TEMPERATURE: 100 F | HEIGHT: 68 IN | BODY MASS INDEX: 34.6 KG/M2 | HEART RATE: 95 BPM | RESPIRATION RATE: 19 BRPM | DIASTOLIC BLOOD PRESSURE: 75 MMHG | SYSTOLIC BLOOD PRESSURE: 160 MMHG | OXYGEN SATURATION: 100 % | WEIGHT: 228.31 LBS

## 2022-08-13 DIAGNOSIS — U07.1 COVID-19: ICD-10-CM

## 2022-08-13 DIAGNOSIS — R06.02 SOB (SHORTNESS OF BREATH): Primary | ICD-10-CM

## 2022-08-13 LAB
ALBUMIN SERPL BCP-MCNC: 3.8 G/DL (ref 3.5–5.2)
ALP SERPL-CCNC: 100 U/L (ref 55–135)
ALT SERPL W/O P-5'-P-CCNC: 38 U/L (ref 10–44)
ANION GAP SERPL CALC-SCNC: 9 MMOL/L (ref 8–16)
AST SERPL-CCNC: 25 U/L (ref 10–40)
BASOPHILS # BLD AUTO: 0.03 K/UL (ref 0–0.2)
BASOPHILS NFR BLD: 0.6 % (ref 0–1.9)
BILIRUB SERPL-MCNC: 0.5 MG/DL (ref 0.1–1)
BUN SERPL-MCNC: 13 MG/DL (ref 6–20)
CALCIUM SERPL-MCNC: 9.2 MG/DL (ref 8.7–10.5)
CHLORIDE SERPL-SCNC: 107 MMOL/L (ref 95–110)
CO2 SERPL-SCNC: 26 MMOL/L (ref 23–29)
CREAT SERPL-MCNC: 0.8 MG/DL (ref 0.5–1.4)
CTP QC/QA: YES
DIFFERENTIAL METHOD: ABNORMAL
EOSINOPHIL # BLD AUTO: 0.2 K/UL (ref 0–0.5)
EOSINOPHIL NFR BLD: 3.4 % (ref 0–8)
ERYTHROCYTE [DISTWIDTH] IN BLOOD BY AUTOMATED COUNT: 12.9 % (ref 11.5–14.5)
EST. GFR  (NO RACE VARIABLE): >60 ML/MIN/1.73 M^2
GLUCOSE SERPL-MCNC: 109 MG/DL (ref 70–110)
HCT VFR BLD AUTO: 38.5 % (ref 37–48.5)
HGB BLD-MCNC: 13 G/DL (ref 12–16)
IMM GRANULOCYTES # BLD AUTO: 0.03 K/UL (ref 0–0.04)
IMM GRANULOCYTES NFR BLD AUTO: 0.6 % (ref 0–0.5)
LACTATE SERPL-SCNC: 1.1 MMOL/L (ref 0.5–2.2)
LYMPHOCYTES # BLD AUTO: 0.9 K/UL (ref 1–4.8)
LYMPHOCYTES NFR BLD: 17.8 % (ref 18–48)
MCH RBC QN AUTO: 29 PG (ref 27–31)
MCHC RBC AUTO-ENTMCNC: 33.8 G/DL (ref 32–36)
MCV RBC AUTO: 86 FL (ref 82–98)
MONOCYTES # BLD AUTO: 0.4 K/UL (ref 0.3–1)
MONOCYTES NFR BLD: 8.2 % (ref 4–15)
NEUTROPHILS # BLD AUTO: 3.5 K/UL (ref 1.8–7.7)
NEUTROPHILS NFR BLD: 69.4 % (ref 38–73)
NRBC BLD-RTO: 0 /100 WBC
PLATELET # BLD AUTO: 192 K/UL (ref 150–450)
PMV BLD AUTO: 9.2 FL (ref 9.2–12.9)
POTASSIUM SERPL-SCNC: 4.1 MMOL/L (ref 3.5–5.1)
PROT SERPL-MCNC: 6.9 G/DL (ref 6–8.4)
RBC # BLD AUTO: 4.48 M/UL (ref 4–5.4)
SARS-COV-2 RDRP RESP QL NAA+PROBE: NEGATIVE
SODIUM SERPL-SCNC: 142 MMOL/L (ref 136–145)
TROPONIN I SERPL DL<=0.01 NG/ML-MCNC: <0.006 NG/ML (ref 0–0.03)
WBC # BLD AUTO: 5.01 K/UL (ref 3.9–12.7)

## 2022-08-13 PROCEDURE — 96374 THER/PROPH/DIAG INJ IV PUSH: CPT

## 2022-08-13 PROCEDURE — 93010 EKG 12-LEAD: ICD-10-PCS | Mod: ,,, | Performed by: INTERNAL MEDICINE

## 2022-08-13 PROCEDURE — 93010 ELECTROCARDIOGRAM REPORT: CPT | Mod: ,,, | Performed by: INTERNAL MEDICINE

## 2022-08-13 PROCEDURE — 83605 ASSAY OF LACTIC ACID: CPT | Performed by: NURSE PRACTITIONER

## 2022-08-13 PROCEDURE — 84484 ASSAY OF TROPONIN QUANT: CPT | Performed by: NURSE PRACTITIONER

## 2022-08-13 PROCEDURE — U0002 COVID-19 LAB TEST NON-CDC: HCPCS | Performed by: NURSE PRACTITIONER

## 2022-08-13 PROCEDURE — 80053 COMPREHEN METABOLIC PANEL: CPT | Performed by: NURSE PRACTITIONER

## 2022-08-13 PROCEDURE — 85025 COMPLETE CBC W/AUTO DIFF WBC: CPT | Performed by: NURSE PRACTITIONER

## 2022-08-13 PROCEDURE — 93005 ELECTROCARDIOGRAM TRACING: CPT

## 2022-08-13 PROCEDURE — 99284 EMERGENCY DEPT VISIT MOD MDM: CPT | Mod: 25

## 2022-08-13 PROCEDURE — 63600175 PHARM REV CODE 636 W HCPCS: Performed by: FAMILY MEDICINE

## 2022-08-13 RX ORDER — KETOROLAC TROMETHAMINE 30 MG/ML
30 INJECTION, SOLUTION INTRAMUSCULAR; INTRAVENOUS
Status: COMPLETED | OUTPATIENT
Start: 2022-08-13 | End: 2022-08-13

## 2022-08-13 RX ADMIN — KETOROLAC TROMETHAMINE 30 MG: 30 INJECTION, SOLUTION INTRAMUSCULAR at 08:08

## 2022-08-13 NOTE — FIRST PROVIDER EVALUATION
"Medical screening exam completed.  I have conducted a focused provider triage encounter, findings are as follows:    Brief history of present illness:  Sob, fever    Vitals:    08/13/22 1818   BP: (!) 172/96   BP Location: Right arm   Patient Position: Sitting   Pulse: 97   Resp: (!) 22   Temp: (!) 100.8 °F (38.2 °C)   TempSrc: Oral   SpO2: 99%   Weight: 103.5 kg (228 lb 4.6 oz)   Height: 5' 8" (1.727 m)       Pertinent physical exam: febrile    Brief workup plan:  Labs, meds, imaging    Preliminary workup initiated; this workup will be continued and followed by the physician or advanced practice provider that is assigned to the patient when roomed.  "

## 2022-08-14 NOTE — ED PROVIDER NOTES
SCRIBE #1 NOTE: I, Lino Nunn, am scribing for, and in the presence of, Fatmata Dickinson MD. I have scribed the entire note.       History     Chief Complaint   Patient presents with    Shortness of Breath     X 2 weeks ago, also reports swelling to legs, general malaise, headache. Denies any medical hx.      Review of patient's allergies indicates:  No Known Allergies      History of Present Illness     HPI    2022, 9:29 PM  History obtained from the patient      History of Present Illness: Laurel Carroll is a 52 y.o. female patient who presents to the Emergency Department for evaluation of SOB which onset gradually within the past two weeks. Symptoms are constant and moderate in severity. No mitigating or exacerbating factors reported. Associated sxs include leg swelling, general malaise, and HA. Patient denies any abd pain, cough, CP, fatigue, and all other sxs at this time. No Prior Tx No further complaints or concerns at this time.       Arrival mode: Personal vehicle    PCP: Vince Anderson MD        Past Medical History:  Past Medical History:   Diagnosis Date    Migraine headache     Urethral stricture        Past Surgical History:  Past Surgical History:   Procedure Laterality Date    APPENDECTOMY       SECTION      x1    TONSILLECTOMY      TUBAL LIGATION      URETHRA SURGERY           Family History:  Family History   Problem Relation Age of Onset    Hypertension Unknown        Social History:  Social History     Tobacco Use    Smoking status: Never Smoker    Smokeless tobacco: Not on file   Substance and Sexual Activity    Alcohol use: No    Drug use: No    Sexual activity: Not on file        Review of Systems     Review of Systems   Constitutional: Negative for chills, fatigue and fever.        (+) General Malaise   HENT: Negative for congestion and sore throat.    Respiratory: Positive for shortness of breath. Negative for cough.    Cardiovascular: Positive for  "leg swelling. Negative for chest pain.   Gastrointestinal: Negative for abdominal pain and nausea.   Genitourinary: Negative for dysuria.   Musculoskeletal: Negative for back pain.   Skin: Negative for rash.   Neurological: Positive for headaches. Negative for weakness.   Hematological: Does not bruise/bleed easily.   All other systems reviewed and are negative.       Physical Exam     Initial Vitals [08/13/22 1818]   BP Pulse Resp Temp SpO2   (!) 172/96 97 (!) 22 (!) 100.8 °F (38.2 °C) 99 %      MAP       --          Physical Exam  Nursing Notes and Vital Signs Reviewed.  Constitutional: Patient is in no acute distress. Well-developed and well-nourished.  Head: Atraumatic. Normocephalic.  Eyes: PERRL. EOM intact. Conjunctivae are not pale. No scleral icterus.  ENT: Mucous membranes are moist. Oropharynx is clear and symmetric.    Neck: Supple. Full ROM. No lymphadenopathy.  Cardiovascular: Regular rate. Regular rhythm. No murmurs, rubs, or gallops. Distal pulses are 2+ and symmetric.  Pulmonary/Chest: No respiratory distress. Clear to auscultation bilaterally. No wheezing or rales.  Abdominal: Soft and non-distended.  There is no tenderness.  No rebound, guarding, or rigidity. Good bowel sounds.  Genitourinary: No CVA tenderness  Musculoskeletal: Moves all extremities. No obvious deformities. 1+ edema bilateral lower extremtities. No calf tenderness.  Skin: Warm and dry.  Neurological:  Alert, awake, and appropriate.  Normal speech.  No acute focal neurological deficits are appreciated.  Psychiatric: Normal affect. Good eye contact. Appropriate in content.     ED Course   Procedures  ED Vital Signs:  Vitals:    08/13/22 1818 08/13/22 2030 08/13/22 2230   BP: (!) 172/96 (!) 166/95 (!) 160/75   Pulse: 97 98 95   Resp: (!) 22 19 19   Temp: (!) 100.8 °F (38.2 °C)  99.9 °F (37.7 °C)   TempSrc: Oral     SpO2: 99% 96% 100%   Weight: 103.5 kg (228 lb 4.6 oz)     Height: 5' 8" (1.727 m)         Abnormal Lab Results:  Labs " Reviewed   CBC W/ AUTO DIFFERENTIAL - Abnormal; Notable for the following components:       Result Value    Immature Granulocytes 0.6 (*)     Lymph # 0.9 (*)     Lymph % 17.8 (*)     All other components within normal limits   SARS-COV-2 RDRP GENE - Normal    Narrative:     This test utilizes isothermal nucleic acid amplification   technology to detect the SARS-CoV-2 RdRp nucleic acid segment.   The analytical sensitivity (limit of detection) is 125 genome   equivalents/mL.   A POSITIVE result implies infection with the SARS-CoV-2 virus;   the patient is presumed to be contagious.     A NEGATIVE result means that SARS-CoV-2 nucleic acids are not   present above the limit of detection. A NEGATIVE result should be   treated as presumptive. It does not rule out the possibility of   COVID-19 and should not be the sole basis for treatment decisions.   If COVID-19 is strongly suspected based on clinical and exposure   history, re-testing using an alternate molecular assay should be   considered.   This test is only for use under the Food and Drug   Administration s Emergency Use Authorization (EUA).   Commercial kits are provided by [employer].   Performance characteristics of the EUA have been independently   verified by [facility] Department of   Pathology and Laboratory Medicine.   _________________________________________________________________   The authorized Fact Sheet for Healthcare Providers and the authorized Fact   Sheet for Patients of the ID NOW COVID-19 are available on the FDA   website:     https://www.fda.gov/media/398625/download  https://www.fda.gov/media/625321/download           COMPREHENSIVE METABOLIC PANEL   LACTIC ACID, PLASMA   TROPONIN I        All Lab Results:  Results for orders placed or performed during the hospital encounter of 08/13/22   CBC auto differential   Result Value Ref Range    WBC 5.01 3.90 - 12.70 K/uL    RBC 4.48 4.00 - 5.40 M/uL    Hemoglobin 13.0 12.0 -  16.0 g/dL    Hematocrit 38.5 37.0 - 48.5 %    MCV 86 82 - 98 fL    MCH 29.0 27.0 - 31.0 pg    MCHC 33.8 32.0 - 36.0 g/dL    RDW 12.9 11.5 - 14.5 %    Platelets 192 150 - 450 K/uL    MPV 9.2 9.2 - 12.9 fL    Immature Granulocytes 0.6 (H) 0.0 - 0.5 %    Gran # (ANC) 3.5 1.8 - 7.7 K/uL    Immature Grans (Abs) 0.03 0.00 - 0.04 K/uL    Lymph # 0.9 (L) 1.0 - 4.8 K/uL    Mono # 0.4 0.3 - 1.0 K/uL    Eos # 0.2 0.0 - 0.5 K/uL    Baso # 0.03 0.00 - 0.20 K/uL    nRBC 0 0 /100 WBC    Gran % 69.4 38.0 - 73.0 %    Lymph % 17.8 (L) 18.0 - 48.0 %    Mono % 8.2 4.0 - 15.0 %    Eosinophil % 3.4 0.0 - 8.0 %    Basophil % 0.6 0.0 - 1.9 %    Differential Method Automated    Comprehensive metabolic panel   Result Value Ref Range    Sodium 142 136 - 145 mmol/L    Potassium 4.1 3.5 - 5.1 mmol/L    Chloride 107 95 - 110 mmol/L    CO2 26 23 - 29 mmol/L    Glucose 109 70 - 110 mg/dL    BUN 13 6 - 20 mg/dL    Creatinine 0.8 0.5 - 1.4 mg/dL    Calcium 9.2 8.7 - 10.5 mg/dL    Total Protein 6.9 6.0 - 8.4 g/dL    Albumin 3.8 3.5 - 5.2 g/dL    Total Bilirubin 0.5 0.1 - 1.0 mg/dL    Alkaline Phosphatase 100 55 - 135 U/L    AST 25 10 - 40 U/L    ALT 38 10 - 44 U/L    Anion Gap 9 8 - 16 mmol/L    eGFR >60 >60 mL/min/1.73 m^2   Lactic Acid, Plasma   Result Value Ref Range    Lactate (Lactic Acid) 1.1 0.5 - 2.2 mmol/L   Troponin I   Result Value Ref Range    Troponin I <0.006 0.000 - 0.026 ng/mL   POCT COVID-19 Rapid Screening   Result Value Ref Range    POC Rapid COVID Negative Negative     Acceptable Yes        Imaging Results:  Imaging Results          X-Ray Chest AP Portable (Final result)  Result time 08/13/22 19:22:00    Final result by Gagan Back MD (08/13/22 19:22:00)                 Impression:      No acute abnormality.      Electronically signed by: Gagan Back  Date:    08/13/2022  Time:    19:22             Narrative:    EXAMINATION:  XR CHEST AP PORTABLE    CLINICAL HISTORY:  COVID-19;    TECHNIQUE:  Single frontal  view of the chest was performed.    COMPARISON:  03/08/2021    FINDINGS:  The lungs are clear, with normal appearance of pulmonary vasculature and no pleural effusion or pneumothorax.    The cardiac silhouette is normal in size. The hilar and mediastinal contours are unremarkable.    Degenerative change of the shoulders.                                 The EKG was ordered, reviewed, and independently interpreted by the ED provider.  Interpretation time: 19:46  Rate: 97 BPM  Rhythm: normal sinus rhythm  Interpretation: No acute ST changes. No STEMI.           The Emergency Provider reviewed the vital signs and test results, which are outlined above.     ED Discussion       10:16 PM: Reassessed pt at this time. Discussed with pt all pertinent ED information and results. Discussed pt dx and plan of tx. Gave pt all f/u and return to the ED instructions. All questions and concerns were addressed at this time. Pt expresses understanding of information and instructions, and is comfortable with plan to discharge. Pt is stable for discharge.    I discussed with patient and/or family/caretaker that evaluation in the ED does not suggest any emergent or life threatening medical conditions requiring immediate intervention beyond what was provided in the ED, and I believe patient is safe for discharge.  Regardless, an unremarkable evaluation in the ED does not preclude the development or presence of a serious of life threatening condition. As such, patient was instructed to return immediately for any worsening or change in current symptoms.       Medical Decision Making:   Clinical Tests:   Lab Tests: Ordered and Reviewed  Radiological Study: Ordered and Reviewed  Medical Tests: Ordered and Reviewed           ED Medication(s):  Medications   ketorolac injection 30 mg (30 mg Intravenous Given 8/13/22 2002)       Discharge Medication List as of 8/13/2022 10:06 PM           Follow-up Information     Schedule an appointment as soon  as possible for a visit  with Vince Anderson MD.    Specialty: Family Medicine  Why: As needed  Contact information:  [address] [facility] [address]  [facility] [place], [place] [address]  [phone]                             Scribe Attestation:   Scribe #1: I performed the above scribed service and the documentation accurately describes the services I performed. I attest to the accuracy of the note.     Attending:   Physician Attestation Statement for Scribe #1: I, Fatmata Dickinson MD, personally performed the services described in this documentation, as scribed by Lino Nunn, in my presence, and it is both accurate and complete.           Clinical Impression       ICD-10-CM ICD-9-CM   1. SOB (shortness of breath)  R06.02 786.05   2. COVID-19  U07.1 079.89       Disposition:   Disposition: Discharged  Condition: Stable       Fatmata Dickinson MD  08/14/22 4087

## 2022-10-21 ENCOUNTER — HOSPITAL ENCOUNTER (EMERGENCY)
Facility: HOSPITAL | Age: 52
Discharge: HOME | End: 2022-10-21
Attending: FAMILY MEDICINE
Payer: MEDICAID

## 2022-10-21 VITALS
DIASTOLIC BLOOD PRESSURE: 88 MMHG | HEIGHT: 68 IN | OXYGEN SATURATION: 97 % | WEIGHT: 225.31 LBS | BODY MASS INDEX: 34.15 KG/M2 | RESPIRATION RATE: 18 BRPM | SYSTOLIC BLOOD PRESSURE: 160 MMHG | HEART RATE: 88 BPM | TEMPERATURE: 99 F

## 2022-10-21 DIAGNOSIS — M25.531 RIGHT WRIST PAIN: Primary | ICD-10-CM

## 2022-10-21 PROCEDURE — 99283 EMERGENCY DEPT VISIT LOW MDM: CPT | Mod: 25

## 2022-10-21 PROCEDURE — 25000003 PHARM REV CODE 250: Performed by: FAMILY MEDICINE

## 2022-10-21 RX ORDER — KETOROLAC TROMETHAMINE 10 MG/1
10 TABLET, FILM COATED ORAL
Status: COMPLETED | OUTPATIENT
Start: 2022-10-21 | End: 2022-10-21

## 2022-10-21 RX ORDER — TRAMADOL HYDROCHLORIDE 50 MG/1
50 TABLET ORAL EVERY 6 HOURS PRN
Qty: 12 TABLET | Refills: 0 | Status: SHIPPED | OUTPATIENT
Start: 2022-10-21

## 2022-10-21 RX ADMIN — KETOROLAC TROMETHAMINE 10 MG: 10 TABLET, FILM COATED ORAL at 05:10

## 2022-10-21 NOTE — FIRST PROVIDER EVALUATION
"Medical screening examination initiated.  I have conducted a focused provider triage encounter, findings are as follows:    Brief history of present illness:  reports right wrist pain    Vitals:    10/21/22 1658   BP: (!) 163/89   BP Location: Right arm   Patient Position: Sitting   Pulse: 101   Resp: 18   Temp: 98.6 °F (37 °C)   TempSrc: Oral   SpO2: 97%   Weight: 102.2 kg (225 lb 5 oz)   Height: 5' 8" (1.727 m)       Pertinent physical exam:  nad    Brief workup plan:  imaging     Preliminary workup initiated; this workup will be continued and followed by the physician or advanced practice provider that is assigned to the patient when roomed.  "

## 2022-10-22 NOTE — ED PROVIDER NOTES
SCRIBE #1 NOTE: I, Lino Nunn and Madalyn Le, am scribing for, and in the presence of, Fatmata Dickinson MD. I have scribed the entire note.       History     Chief Complaint   Patient presents with    Wrist Pain     R wrist pain today pt states she had a fracture in July     Review of patient's allergies indicates:  No Known Allergies      History of Present Illness     HPI    10/21/2022, 7:54 PM  History obtained from the patient      History of Present Illness: Laurel Carroll is a 52 y.o. female patient  who presents to the Emergency Department for evaluation of R Wrist Pain which onset immediately after getting out of tub yesterday. The pt's pain rating is a 10/10 and the pt has stated that it feels worse than when she first broke the wrist. Symptoms are constant and moderate in severity. No mitigating or exacerbating factors reported. No associated sxs. Patient denies any SOB, CP, back pain, abd pain, and all other sxs at this time. No Prior Tx. The pt fractured her wrist in July. No further complaints or concerns at this time.       Arrival mode: Personal vehicle    PCP: Vince Anderson MD        Past Medical History:  Past Medical History:   Diagnosis Date    Migraine headache     Urethral stricture        Past Surgical History:  Past Surgical History:   Procedure Laterality Date    APPENDECTOMY       SECTION      x1    TONSILLECTOMY      TUBAL LIGATION      URETHRA SURGERY           Family History:  Family History   Problem Relation Age of Onset    Hypertension Unknown        Social History:  Social History     Tobacco Use    Smoking status: Never    Smokeless tobacco: Not on file   Substance and Sexual Activity    Alcohol use: No    Drug use: No    Sexual activity: Not on file        Review of Systems     Review of Systems   Constitutional:  Negative for chills and fever.   HENT:  Negative for congestion, dental problem and sore throat.    Respiratory:  Negative for cough and  "shortness of breath.    Cardiovascular:  Negative for chest pain and palpitations.   Gastrointestinal:  Negative for abdominal pain, diarrhea, nausea and vomiting.   Genitourinary:  Negative for dysuria and flank pain.   Musculoskeletal:  Positive for arthralgias (R wrist pain). Negative for back pain.   Skin:  Negative for rash.   Neurological:  Negative for weakness and headaches.   Hematological:  Does not bruise/bleed easily.   All other systems reviewed and are negative.     Physical Exam     Initial Vitals [10/21/22 1658]   BP Pulse Resp Temp SpO2   (!) 163/89 101 18 98.6 °F (37 °C) 97 %      MAP       --          Physical Exam  Nursing Notes and Vital Signs Reviewed.  Constitutional: Patient is in no acute distress. Well-developed and well-nourished.  Head: Atraumatic. Normocephalic.  Eyes: PERRL. EOM intact. Conjunctivae are not pale. No scleral icterus.  ENT: Mucous membranes are moist. Oropharynx is clear and symmetric.    Neck: Supple. Full ROM. No lymphadenopathy.  Cardiovascular: Regular rate. Regular rhythm. No murmurs, rubs, or gallops. Distal pulses are 2+ and symmetric.  Pulmonary/Chest: No respiratory distress. Clear to auscultation bilaterally. No wheezing or rales.  Abdominal: Soft and non-distended.  There is no tenderness.  No rebound, guarding, or rigidity. Good bowel sounds.  Genitourinary: No CVA tenderness  Musculoskeletal: Moves all extremities. No edema. No bony deformity; mild tenderness to the R wrist.  Skin: Warm and dry.  Neurological:  Alert, awake, and appropriate.  Normal speech.  No acute focal neurological deficits are appreciated.  Psychiatric: Normal affect. Good eye contact. Appropriate in content.     ED Course   Procedures  ED Vital Signs:  Vitals:    10/21/22 1658 10/21/22 2020   BP: (!) 163/89 (!) 160/88   Pulse: 101 88   Resp: 18 18   Temp: 98.6 °F (37 °C) 98.6 °F (37 °C)   TempSrc: Oral Oral   SpO2: 97% 97%   Weight: 102.2 kg (225 lb 5 oz)    Height: 5' 8" (1.727 m)  "       Abnormal Lab Results:  Labs Reviewed - No data to display         Imaging Results:  Imaging Results              X-Ray Wrist Complete Right (Final result)  Result time 10/21/22 17:29:44      Final result by Gagan Back MD (10/21/22 17:29:44)                   Impression:      No acute fracture or dislocation identified.  Mild soft tissue swelling suspected.      Electronically signed by: Gagan Back  Date:    10/21/2022  Time:    17:29               Narrative:    EXAMINATION:  XR WRIST COMPLETE 3 VIEWS RIGHT    CLINICAL HISTORY:  Pain in right wrist    TECHNIQUE:  PA, lateral, and oblique views of the right wrist were performed.    COMPARISON:  None    FINDINGS:  No fracture.  No traumatic malalignment.  No osseous destructive process.  Soft tissue swelling suspected.  Mild degenerative changes not unexpected for age.                                                The Emergency Provider reviewed the vital signs and test results, which are outlined above.     ED Discussion       8:02 PM: Reassessed pt at this time.  Discussed with pt all pertinent ED information and results. Discussed pt dx and plan of tx. Gave pt all f/u and return to the ED instructions. All questions and concerns were addressed at this time. Pt expresses understanding of information and instructions, and is comfortable with plan to discharge. Pt is stable for discharge.    I discussed with patient and/or family/caretaker that evaluation in the ED does not suggest any emergent or life threatening medical conditions requiring immediate intervention beyond what was provided in the ED, and I believe patient is safe for discharge.  Regardless, an unremarkable evaluation in the ED does not preclude the development or presence of a serious of life threatening condition. As such, patient was instructed to return immediately for any worsening or change in current symptoms.        Medical Decision Making:   Clinical Tests:   Radiological  Study: Ordered and Reviewed         ED Medication(s):  Medications   ketorolac tablet 10 mg (10 mg Oral Given 10/21/22 1752)       Discharge Medication List as of 10/21/2022  7:55 PM        START taking these medications    Details   traMADoL (ULTRAM) 50 mg tablet Take 1 tablet (50 mg total) by mouth every 6 (six) hours as needed., Starting Fri 10/21/2022, Print              Follow-up Information       Vince Anderson MD.    Specialty: Family Medicine  Contact information:  [address] [address]  [phone]                                 Scribe Attestation:   Scribe #1: I performed the above scribed service and the documentation accurately describes the services I performed. I attest to the accuracy of the note.     Attending:   Physician Attestation Statement for Scribe #1: I, Fatmata Dickinson MD, personally performed the services described in this documentation, as scribed by Lino Nunn, in my presence, and it is both accurate and complete.           Clinical Impression       ICD-10-CM ICD-9-CM   1. Right wrist pain  M25.531 719.43       Disposition:   Disposition: Discharged  Condition: Stable      Fatmata Dickinson MD  10/22/22 4603

## 2024-01-31 PROBLEM — N94.89 PELVIC CONGESTION SYNDROME: Status: ACTIVE | Noted: 2024-01-31
